# Patient Record
Sex: MALE | Race: WHITE | NOT HISPANIC OR LATINO | URBAN - METROPOLITAN AREA
[De-identification: names, ages, dates, MRNs, and addresses within clinical notes are randomized per-mention and may not be internally consistent; named-entity substitution may affect disease eponyms.]

---

## 2020-04-01 ENCOUNTER — EMERGENCY (EMERGENCY)
Facility: HOSPITAL | Age: 40
LOS: 0 days | Discharge: HOME | End: 2020-04-01
Admitting: EMERGENCY MEDICINE
Payer: COMMERCIAL

## 2020-04-01 VITALS
DIASTOLIC BLOOD PRESSURE: 70 MMHG | TEMPERATURE: 98 F | SYSTOLIC BLOOD PRESSURE: 144 MMHG | OXYGEN SATURATION: 99 % | RESPIRATION RATE: 18 BRPM | HEART RATE: 74 BPM

## 2020-04-01 PROCEDURE — 99282 EMERGENCY DEPT VISIT SF MDM: CPT

## 2020-04-01 NOTE — ED PROVIDER NOTE - PHYSICAL EXAMINATION
Physical Exam    Vital Signs: I have reviewed the initial vital signs.  Constitutional: well-nourished, appears stated age, no acute distress  Dental: pain to tooth #19 with swelling to gums  Neuro: AOx3, No focal deficits noted

## 2020-04-01 NOTE — CONSULT NOTE ADULT - SUBJECTIVE AND OBJECTIVE BOX
Patient is a 39y old  Male who presents with a chief complaint of pain on lower left    HPI: Patient with no significant medical history presents to ER today with complaints of pain on lower left. Patient stated that the pain started last night. Symptoms include radiating pain on left side with associated headache. Patient denies any fever, swelling, lymphadenopathy, trismus, dysphagia, or dyspnea.       PAST MEDICAL & SURGICAL HISTORY:  No pertinent past medical history    (   ) heart valve replacement  (   ) joint replacement  (   ) pregnancy    MEDICATIONS  (STANDING):    MEDICATIONS  (PRN):      Allergies    No Known Allergies    Intolerances        FAMILY HISTORY:      *SOCIAL HISTORY: (   ) Tobacco; (   ) ETOH    *Last Dental Visit: Can't remember    Vital Signs Last 24 Hrs  T(C): 36.4 (01 Apr 2020 13:19), Max: 36.4 (01 Apr 2020 13:19)  T(F): 97.5 (01 Apr 2020 13:19), Max: 97.5 (01 Apr 2020 13:19)  HR: 74 (01 Apr 2020 13:19) (74 - 74)  BP: 144/70 (01 Apr 2020 13:19) (144/70 - 144/70)  BP(mean): --  RR: 18 (01 Apr 2020 13:19) (18 - 18)  SpO2: 99% (01 Apr 2020 13:19) (99% - 99%)      EOE:  No extraoral abnormalities    IOE:  <<permanent>> dentition: <<grossly intact>>            hard/soft palate:  (   ) palatal torus, <<No pathology noted>>           tongue/FOM <<No pathology noted>>           labial/buccal mucosa <<No pathology noted>>           ( x  ) percussion - pain on percussion on #19 and 20           (   ) palpation           (   ) swelling            (   ) abscess           (   ) sinus tract    Dentition present: Yes  Mobility: Grade 1 Mobility present on #19  Caries: Distal recurrent decay on #20, PAP on mesial root of #19        *DENTAL RADIOGRAPHS: Periapical (1) - #19 has MOD restoration and periapical radiolucency present on mesial root; #20 has DO restoration with recurrent decay on distal aspect that is contacting the pulpal chamber        *ASSESSMENT: BP: 110/70. Temp: 98.9 degrees F. Pulse: 74. Clinical evaluation showed #19 and 20 to both be sensitive to percussion, and #20 was sensitive to cold testing. Radiographic evaluation confirmed #19 to have a periapical radiolucency on the mesial root and #20 to have recurrent decay present on distal aspect. Explained findings to patient and treatment options of either root canal or extraction for #19 and 20. Explained to patient that, due to current guidelines, neither treatment could be done because of the potential spread of aerosols. Recommended patient to return in May for follow-up regarding #19 and 20. Recommended patient to return to dental clinic if any swelling should arise. Prescriptions given for Amoxicillin 500mg TID 7 day supply and Ibuprofen 600mg q6h 3 day supply.       *PLAN: Take Amoxicillin and Ibuprofen as prescribed, return to dental clinic in May        RECOMMENDATIONS:  1) Amoxicillin/Ibuprofen as prescribed  2) Dental F/U with outpatient dentist for comprehensive dental care.   3) If any difficulty swallowing/breathing, fever occur, return to ER.     Dr. Roshan Hung, pager [phone]

## 2020-04-05 DIAGNOSIS — K08.89 OTHER SPECIFIED DISORDERS OF TEETH AND SUPPORTING STRUCTURES: ICD-10-CM

## 2020-04-05 DIAGNOSIS — F17.200 NICOTINE DEPENDENCE, UNSPECIFIED, UNCOMPLICATED: ICD-10-CM
